# Patient Record
Sex: MALE | Race: WHITE | NOT HISPANIC OR LATINO | Employment: UNEMPLOYED | URBAN - METROPOLITAN AREA
[De-identification: names, ages, dates, MRNs, and addresses within clinical notes are randomized per-mention and may not be internally consistent; named-entity substitution may affect disease eponyms.]

---

## 2022-09-09 ENCOUNTER — HOSPITAL ENCOUNTER (EMERGENCY)
Facility: HOSPITAL | Age: 27
Discharge: HOME | End: 2022-09-09
Attending: EMERGENCY MEDICINE | Admitting: EMERGENCY MEDICINE

## 2022-09-09 VITALS
TEMPERATURE: 97.5 F | RESPIRATION RATE: 16 BRPM | DIASTOLIC BLOOD PRESSURE: 87 MMHG | SYSTOLIC BLOOD PRESSURE: 124 MMHG | HEART RATE: 121 BPM | OXYGEN SATURATION: 96 %

## 2022-09-09 DIAGNOSIS — S70.212A: ICD-10-CM

## 2022-09-09 DIAGNOSIS — S50.312A ABRASION OF SKIN OF LEFT ELBOW: Primary | ICD-10-CM

## 2022-09-09 DIAGNOSIS — S60.811A: ICD-10-CM
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
## 2022-09-09 PROCEDURE — 99283 EMERGENCY DEPT VISIT LOW MDM: CPT

## 2022-09-09 RX ORDER — IBUPROFEN 600 MG/1
600 TABLET ORAL EVERY 6 HOURS PRN
Qty: 15 TABLET | Refills: 0 | Status: SHIPPED | OUTPATIENT
Start: 2022-09-09

## 2022-09-09 NOTE — ED PROVIDER NOTES
MD ATTESTATION NOTE  I wore full protective equipment throughout this patient encounter including an N95 face mask, googles, gown and gloves. Hand hygiene was performed before donning protective equipment and after removal when leaving the room.    The SHENA and I have discussed this patient's history, physical exam, and treatment plan. I have reviewed the documentation and personally had a face to face interaction with the patient. I affirm the SHENA documentation and agree with their diagnostics, findings, treatment, plan, and disposition.    I provided a substantive portion of the care of this patient.  I personally performed the physical exam, in its entirety.  The attached note describes my personal findings.    Ted Dennis is a 26 y.o. male who presents to the ED c/o multiple wounds on bilateral arms as well as abrasion on his left hip.  Patient reports he was skateboarding when he slipped and fell, denies any head injury, no loss consciousness, no neck or back pain.  Patient reports abrasions on bilateral arms as well as small abrasion on his left hip.  Patient denies any bony tenderness, no concern for fracture.  Patient denies any weakness or numbness.  Tetanus is up-to-date.  Patient reports he was at baseline health prior to injury.    On exam:  General: NAD.  Head: NCAT.  ENT: nares patent, no scleral icterus  Neck: Supple, trachea midline.  Cardiac: regular rate and rhythm.  Lungs: normal effort.  Abdomen: Soft, NTTP.   Extremities: Moves all extremities well, no peripheral edema  Neuro: alert, MAEW, follows commands  Psych: calm, cooperative  Skin: Warm, dry.  Multiple abrasions on bilateral lower arms, small abrasion on the palm of the left hand, small abrasion on the left hip.  No bony tenderness, no crepitus or deformity.    Medical Decision Making:  After the initial H&P, I discussed pertinent information from history and physical exam with patient/family.  Discussed differential diagnosis.   Discussed plan for ED evaluation/work-up/treatment.  All questions answered.  Patient/family is agreeable with plan.         Diagnosis  Final diagnoses:   Abrasion of skin of left elbow   Abrasion of skin of right wrist   Abrasion of skin of left hip   Fall from skateboard, initial encounter        Earl Moon MD  09/09/22 5113

## 2022-09-09 NOTE — ED TRIAGE NOTES
Patient to ed from  with complaints of abrasions to multiple areas after falling off his skate board. Patient denies LOC or hitting head. Tetanus within 2019 or the last year.

## 2022-09-09 NOTE — ED PROVIDER NOTES
EMERGENCY DEPARTMENT ENCOUNTER    Room Number:  A03/03  Date of encounter:  9/9/2022  PCP: Mariela Smith MD  Historian: Patient  Full history not obtainable due to: None    HPI:  Chief Complaint: Abrasions    Context: Ted Dennis is a 26 y.o. male who presents to the ED c/o multiple abrasions related to a fall from a skateboard just prior to arrival.  The patient states that he was skateboarding down a steep hill when he fell forward and injured himself on the pavement.  He denies injury to the head or neck as well as loss of consciousness.  He did not injure his chest or abdomen.  States that his only discomfort is surrounding the superficial abrasions to the left elbow, right wrist, left hip.  Bleeding controlled at this time.  He is up-to-date on his tetanus vaccination.  Has not treated these wounds prior to arrival.      MEDICAL RECORD REVIEW:    Upon review of the medical record it appears the patient was most recently evaluated in the office with his family medicine provider on April 12, 2022 for urinary hesitancy.      PAST MEDICAL HISTORY    Active Ambulatory Problems     Diagnosis Date Noted   • No Active Ambulatory Problems     Resolved Ambulatory Problems     Diagnosis Date Noted   • No Resolved Ambulatory Problems     No Additional Past Medical History         PAST SURGICAL HISTORY  No past surgical history on file.      FAMILY HISTORY  No family history on file.      SOCIAL HISTORY  Social History     Socioeconomic History   • Marital status: Single         ALLERGIES  Patient has no known allergies.        REVIEW OF SYSTEMS    All systems reviewed and marked as negative except as listed in HPI     PHYSICAL EXAM    I have reviewed the triage vital signs and nursing notes.    ED Triage Vitals [09/09/22 1426]   Temp Heart Rate Resp BP SpO2   97.5 °F (36.4 °C) (!) 121 16 -- 96 %      Temp src Heart Rate Source Patient Position BP Location FiO2 (%)   Tympanic -- -- -- --       Physical  Exam  Constitutional:       General: He is not in acute distress.     Appearance: He is well-developed. He is not ill-appearing.   HENT:      Head: Normocephalic and atraumatic.   Eyes:      General: No scleral icterus.     Conjunctiva/sclera: Conjunctivae normal.   Neck:      Trachea: No tracheal deviation.   Cardiovascular:      Rate and Rhythm: Normal rate and regular rhythm.   Pulmonary:      Effort: Pulmonary effort is normal.      Breath sounds: Normal breath sounds.   Abdominal:      Palpations: Abdomen is soft.      Tenderness: There is no abdominal tenderness. There is no guarding.   Musculoskeletal:         General: No deformity.      Cervical back: Normal range of motion.   Lymphadenopathy:      Cervical: No cervical adenopathy.   Skin:     General: Skin is warm and dry.          Neurological:      Mental Status: He is alert and oriented to person, place, and time.   Psychiatric:         Mood and Affect: Mood normal.         Speech: Speech normal.         Behavior: Behavior normal.         Vital signs and nursing notes reviewed.            LAB RESULTS  No results found for this or any previous visit (from the past 24 hour(s)).    Ordered the above labs and independently reviewed the results.        RADIOLOGY  No Radiology Exams Resulted Within Past 24 Hours    I ordered the above noted radiological studies. Independently reviewed by me and discussed with radiologist.  See dictation above for official radiology interpretation.      PROCEDURES    Procedures        MEDICATIONS GIVEN IN ER    Medications - No data to display      PROGRESS, DATA ANALYSIS, CONSULTS, AND MEDICAL DECISION MAKING    All labs have been independently reviewed by me.  All radiology studies have been reviewed by me.   EKG's independently reviewed by me.  Discussion below represents my analysis of pertinent findings related to patient's condition, differential diagnosis, treatment plan and final disposition.    DIFFERENTIAL DIAGNOSIS  INCLUDE BUT NOT LIMITED TO:     Laceration, abrasion, avulsion         AS OF 14:42 EDT VITALS:    BP -    HR - (!) 121  TEMP - 97.5 °F (36.4 °C) (Tympanic)  02 SATS - 96%    1443 I rechecked the patient.  I discussed the patient's diagnosis, and plan for discharge.  A repeat exam reveals no new worrisome changes from my initial exam findings.  The patient understands that the fact that they are being discharged does not denote that nothing is abnormal, it indicates that no clinical emergency is present and that they must follow-up as directed in order to properly maintain their health.  Follow-up instructions (specifically listed below) and return to ER precautions were given at this time.  I specifically instructed the patient to follow-up with their PCP.  The patient understands and agrees with the plan, and is ready for discharge.  All questions answered.        DIAGNOSIS  Final diagnoses:   Abrasion of skin of left elbow   Abrasion of skin of right wrist   Abrasion of skin of left hip   Fall from skateboard, initial encounter         DISPOSITION  D/c    Pt masked in first look. I wore a surgical mask throughout my encounters with the pt. I performed hand hygiene on entry into the pt room and upon exit.     Dictated utilizing Dragon dictation     Note Disclaimer: At [facility], we believe that sharing information builds trust and better relationships. You are receiving this note because you recently visited [facility]. It is possible you will see health information before a provider has talked with you about it. This kind of information can be easy to misunderstand. To help you fully understand what it means for your health, we urge you to discuss this note with your provider.      Pramod Quintana PA  09/09/22 5759